# Patient Record
Sex: MALE | Race: WHITE | NOT HISPANIC OR LATINO | Employment: OTHER | URBAN - METROPOLITAN AREA
[De-identification: names, ages, dates, MRNs, and addresses within clinical notes are randomized per-mention and may not be internally consistent; named-entity substitution may affect disease eponyms.]

---

## 2017-01-24 ENCOUNTER — HISTORICAL (OUTPATIENT)
Dept: ADMINISTRATIVE | Facility: HOSPITAL | Age: 82
End: 2017-01-24

## 2019-06-19 ENCOUNTER — HISTORICAL (OUTPATIENT)
Dept: ADMINISTRATIVE | Facility: HOSPITAL | Age: 84
End: 2019-06-19

## 2019-06-25 LAB
FINAL CULTURE: NORMAL
FINAL CULTURE: NORMAL

## 2020-12-11 ENCOUNTER — HISTORICAL (OUTPATIENT)
Dept: ADMINISTRATIVE | Facility: HOSPITAL | Age: 85
End: 2020-12-11

## 2020-12-11 LAB
RET# (OHS): 0.03 X10^6/ML (ref 0.03–0.1)
RETICULOCYTE COUNT AUTOMATED (OLG): 1 % (ref 1.1–2.1)

## 2022-03-04 ENCOUNTER — HISTORICAL (OUTPATIENT)
Dept: ANESTHESIOLOGY | Facility: HOSPITAL | Age: 87
End: 2022-03-04

## 2022-03-04 ENCOUNTER — HISTORICAL (OUTPATIENT)
Dept: ADMINISTRATIVE | Facility: HOSPITAL | Age: 87
End: 2022-03-04

## 2023-04-21 ENCOUNTER — HOSPITAL ENCOUNTER (OUTPATIENT)
Dept: PREADMISSION TESTING | Facility: HOSPITAL | Age: 88
Discharge: HOME | End: 2023-04-21
Payer: MEDICARE

## 2023-04-21 VITALS — HEIGHT: 75 IN | WEIGHT: 155.31 LBS | BODY MASS INDEX: 19.31 KG/M2

## 2023-04-21 DIAGNOSIS — R22.1 NECK MASS: Primary | ICD-10-CM

## 2023-04-21 RX ORDER — FUROSEMIDE 20 MG/1
TABLET ORAL
COMMUNITY
Start: 2023-03-27

## 2023-04-21 RX ORDER — PANCRELIPASE 24000; 76000; 120000 [USP'U]/1; [USP'U]/1; [USP'U]/1
CAPSULE, DELAYED RELEASE PELLETS ORAL
COMMUNITY
Start: 2023-04-12

## 2023-04-21 RX ORDER — SACUBITRIL AND VALSARTAN 49; 51 MG/1; MG/1
TABLET, FILM COATED ORAL
COMMUNITY
Start: 2023-04-05

## 2023-04-21 RX ORDER — HYDROCODONE BITARTRATE AND ACETAMINOPHEN 10; 325 MG/1; MG/1
TABLET ORAL
COMMUNITY
Start: 2023-02-28

## 2023-04-21 RX ORDER — SPIRONOLACTONE 25 MG/1
TABLET ORAL
COMMUNITY
Start: 2023-04-04

## 2023-04-21 RX ORDER — AMIODARONE HYDROCHLORIDE 200 MG/1
TABLET ORAL
COMMUNITY
Start: 2023-03-13

## 2023-04-27 ENCOUNTER — HOSPITAL ENCOUNTER (OUTPATIENT)
Dept: RADIOLOGY | Facility: HOSPITAL | Age: 88
Discharge: HOME | End: 2023-04-27
Attending: OTOLARYNGOLOGY
Payer: MEDICARE

## 2023-04-27 VITALS
OXYGEN SATURATION: 99 % | RESPIRATION RATE: 20 BRPM | SYSTOLIC BLOOD PRESSURE: 134 MMHG | DIASTOLIC BLOOD PRESSURE: 74 MMHG | HEART RATE: 70 BPM | TEMPERATURE: 97 F

## 2023-04-27 DIAGNOSIS — R22.1 NECK MASS: ICD-10-CM

## 2023-04-27 DIAGNOSIS — R22.0 INTRACRANIAL SWELLING: ICD-10-CM

## 2023-04-27 LAB
ANION GAP SERPL CALC-SCNC: 3 MEQ/L (ref 2–13)
APTT PPP: 24.9 SECONDS (ref 23–29.4)
BASOPHILS # BLD AUTO: 0.02 X10(3)/MCL (ref 0.01–0.08)
BASOPHILS NFR BLD AUTO: 0.5 % (ref 0.1–1.2)
BUN SERPL-MCNC: 31 MG/DL (ref 7–20)
CALCIUM SERPL-MCNC: 9.7 MG/DL (ref 8.4–10.2)
CHLORIDE SERPL-SCNC: 109 MMOL/L (ref 98–110)
CO2 SERPL-SCNC: 26 MMOL/L (ref 21–32)
CREAT SERPL-MCNC: 1.59 MG/DL (ref 0.66–1.25)
CREAT/UREA NIT SERPL: 19 (ref 12–20)
EOSINOPHIL # BLD AUTO: 0.09 X10(3)/MCL (ref 0.04–0.54)
EOSINOPHIL NFR BLD AUTO: 2 % (ref 0.7–7)
ERYTHROCYTE [DISTWIDTH] IN BLOOD BY AUTOMATED COUNT: 13.2 % (ref 11.6–14.4)
GFR SERPLBLD CREATININE-BSD FMLA CKD-EPI: 40 MLS/MIN/1.73/M2
GLUCOSE SERPL-MCNC: 81 MG/DL (ref 70–115)
HCT VFR BLD AUTO: 29.3 % (ref 36–52)
HGB BLD-MCNC: 9.6 G/DL (ref 13–18)
IMM GRANULOCYTES # BLD AUTO: 0.01 X10(3)/MCL (ref 0–0.03)
IMM GRANULOCYTES NFR BLD AUTO: 0.2 % (ref 0–0.5)
INR BLD: 1.04
LYMPHOCYTES # BLD AUTO: 1.11 X10(3)/MCL (ref 1.32–3.57)
LYMPHOCYTES NFR BLD AUTO: 25.2 % (ref 20–55)
MCH RBC QN AUTO: 32 PG (ref 27–34)
MCV RBC AUTO: 97.7 FL (ref 79–99)
MEAN CELL HEMOGLOBIN CONCENTRATION (OHS) G/DL: 32.8 G/DL (ref 31–37)
MONOCYTES # BLD AUTO: 0.36 X10(3)/MCL (ref 0.3–0.82)
MONOCYTES NFR BLD AUTO: 8.2 % (ref 4.7–12.5)
NEUTROPHILS # BLD AUTO: 2.82 X10(3)/MCL (ref 1.78–5.38)
NEUTROPHILS NFR BLD AUTO: 63.9 % (ref 37–73)
NRBC BLD AUTO-RTO: 0 % (ref 0–1)
PLATELET # BLD AUTO: 143 X10(3)/MCL (ref 140–371)
PMV BLD AUTO: 10 FL (ref 9.4–12.4)
POTASSIUM SERPL-SCNC: 4.5 MMOL/L (ref 3.5–5.1)
PROTHROMBIN TIME: 10.6 SECONDS (ref 9.3–11.9)
RBC # BLD AUTO: 3 X10(6)/MCL (ref 4–6)
SODIUM SERPL-SCNC: 138 MMOL/L (ref 135–145)
WBC # SPEC AUTO: 4.4 X10(3)/MCL (ref 4–11.5)

## 2023-04-27 PROCEDURE — 85730 THROMBOPLASTIN TIME PARTIAL: CPT | Performed by: OTOLARYNGOLOGY

## 2023-04-27 PROCEDURE — 36415 COLL VENOUS BLD VENIPUNCTURE: CPT | Performed by: OTOLARYNGOLOGY

## 2023-04-27 PROCEDURE — 85610 PROTHROMBIN TIME: CPT | Performed by: OTOLARYNGOLOGY

## 2023-04-27 PROCEDURE — 85025 COMPLETE CBC W/AUTO DIFF WBC: CPT | Performed by: OTOLARYNGOLOGY

## 2023-04-27 PROCEDURE — 10005 FNA BX W/US GDN 1ST LES: CPT

## 2023-04-27 PROCEDURE — 80048 BASIC METABOLIC PNL TOTAL CA: CPT | Performed by: OTOLARYNGOLOGY

## 2023-04-27 RX ORDER — IBUPROFEN 600 MG/1
600 TABLET ORAL EVERY 6 HOURS PRN
Status: DISCONTINUED | OUTPATIENT
Start: 2023-04-27 | End: 2023-04-28 | Stop reason: HOSPADM

## 2023-04-27 NOTE — DISCHARGE INSTRUCTIONS
Follow-up with Dr Ang as scheduled on May 8.    May remove bandage tomorrow, clean with soap and water gently, leave open to air if not draining, otherwise, apply bandaid.    Notify MD for any of the following:  Fever of 100.5 or above  Swelling to area  Foul odor from site  Draining yellow/green from site  Excessive bleeding for site  Nausea or vomiting

## 2023-04-27 NOTE — PLAN OF CARE
Report called to Min at [facility], gave him an opportunity for questions, none asked, patient will leave at 0900.

## 2023-04-27 NOTE — PLAN OF CARE
Received from Radiology per stretcher in good condition, lt neck dressing dry and intact, no complaints at present. Attendant informed he can go back at 0900.

## 2023-04-27 NOTE — PLAN OF CARE
Discharge instructions reviewed with attendant, copy given.  Discharged per wheelchair to van accompanied by attendant.